# Patient Record
Sex: MALE | Race: WHITE | NOT HISPANIC OR LATINO | Employment: FULL TIME | URBAN - METROPOLITAN AREA
[De-identification: names, ages, dates, MRNs, and addresses within clinical notes are randomized per-mention and may not be internally consistent; named-entity substitution may affect disease eponyms.]

---

## 2023-07-07 ENCOUNTER — OFFICE VISIT (OUTPATIENT)
Dept: URGENT CARE | Facility: CLINIC | Age: 33
End: 2023-07-07
Payer: COMMERCIAL

## 2023-07-07 VITALS
OXYGEN SATURATION: 98 % | TEMPERATURE: 98 F | DIASTOLIC BLOOD PRESSURE: 90 MMHG | HEART RATE: 75 BPM | RESPIRATION RATE: 16 BRPM | SYSTOLIC BLOOD PRESSURE: 145 MMHG

## 2023-07-07 DIAGNOSIS — S01.91XA: Primary | ICD-10-CM

## 2023-07-07 PROCEDURE — G0382 LEV 3 HOSP TYPE B ED VISIT: HCPCS | Performed by: PHYSICIAN ASSISTANT

## 2023-07-07 NOTE — PATIENT INSTRUCTIONS
Laceration Care    Cleaning the wound   - Clean the wound 2-3 times daily with a 1:1 mixture peroxide water. - Clean off all scabbing debris that may form on the wound line gently.    - Pat dry with a clean towel or gauze, apply Neosporin or bacitracin, and apply clean dressing.   - Return here or go to the ER immediately with any signs of infection (increased redness, increased swelling, increased pain, pus drainage, or fever)

## 2023-07-07 NOTE — PROGRESS NOTES
[facility]        NAME: Jyoti Layton is a 29 y.o. male  : 1990    MRN: [id number]  DATE: 2023  TIME: 6:34 PM      Assessment and Plan     Laceration of head without foreign body, initial encounter Jassi Tubbs  1. Laceration of head without foreign body, initial encounter          Note: The wound is still closely approximated, and I told the patient that if we didn't close it, it would just leave a scar, but it doesn't have to be closed. Patient opted not to close wound and just put bacitracin on it at home and keep it clean. Patient Instructions     Patient Instructions   Laceration Care    Cleaning the wound   - Clean the wound 2-3 times daily with a 1:1 mixture peroxide water. - Clean off all scabbing debris that may form on the wound line gently. - Pat dry with a clean towel or gauze, apply Neosporin or bacitracin, and apply clean dressing.   - Return here or go to the ER immediately with any signs of infection (increased redness, increased swelling, increased pain, pus drainage, or fever)          Follow up with PCP in 3-5 days. Go to ER if symptoms worsen. Chief Complaint     Chief Complaint   Patient presents with   • Head Laceration     Pt reports hitting top of head on door latch earlier today, reports Tetanus within last 5 years. History of Present Illness     Patient presents with a laceration to the left forehead/hairline x today at 11:15am. He states he hit it on a van door latch. He rinsed it out with tap water and cleaned it with hydrogen peroxide. He denies pain, headache, nausea, or dizziness. Head Laceration  Pertinent negatives include no abdominal pain, arthralgias, chest pain, chills, congestion, coughing, fatigue, fever, headaches, myalgias, nausea, numbness, rash, sore throat or vomiting. Review of Systems     Review of Systems   Constitutional: Negative for chills, fatigue and fever.    HENT: Negative for congestion, ear pain, postnasal drip, rhinorrhea, sinus pressure, sinus pain, sneezing and sore throat. Eyes: Negative for pain and visual disturbance. Respiratory: Negative for cough and shortness of breath. Cardiovascular: Negative for chest pain and palpitations. Gastrointestinal: Negative for abdominal pain, diarrhea, nausea and vomiting. Genitourinary: Negative for dysuria and hematuria. Musculoskeletal: Negative for arthralgias, back pain and myalgias. Skin: Positive for wound. Negative for rash. Neurological: Negative for dizziness, seizures, syncope, numbness and headaches. All other systems reviewed and are negative. Current Medications     No current outpatient medications on file. Current Allergies     Allergies as of 07/07/2023 - never reviewed   Allergen Reaction Noted   • Pollen extract Allergic Rhinitis 05/06/2015              The following portions of the patient's history were reviewed and updated as appropriate: allergies, current medications, past family history, past medical history, past social history, past surgical history, and problem list.     History reviewed. No pertinent past medical history. History reviewed. No pertinent surgical history. History reviewed. No pertinent family history. Medications have been verified. Objective     /90   Pulse 75   Temp 98 °F (36.7 °C)   Resp 16   SpO2 98%   No LMP for male patient. Physical Exam     Physical Exam  Vitals and nursing note reviewed. Constitutional:       Appearance: Normal appearance. He is normal weight. HENT:      Head: Normocephalic. Comments: 3cm laceration on scalp in hairline. Bleeding controlled. Wound edges still closely approximated. No signs of infection  Eyes:      Extraocular Movements: Extraocular movements intact. Conjunctiva/sclera: Conjunctivae normal.      Pupils: Pupils are equal, round, and reactive to light. Cardiovascular:      Rate and Rhythm: Normal rate. Pulmonary:      Effort: Pulmonary effort is normal.   Skin:     General: Skin is warm and dry. Neurological:      General: No focal deficit present. Mental Status: He is alert and oriented to person, place, and time. Psychiatric:         Mood and Affect: Mood normal.         Behavior: Behavior normal.       Procedures:   Cleaned laceration with NSS to visualize it better. Wound edges mostly approximated naturally. Wound is in the part line of his hair.

## 2025-04-12 ENCOUNTER — APPOINTMENT (OUTPATIENT)
Dept: URGENT CARE | Age: 35
End: 2025-04-12
Payer: OTHER MISCELLANEOUS

## 2025-04-12 PROCEDURE — 99283 EMERGENCY DEPT VISIT LOW MDM: CPT

## 2025-04-12 PROCEDURE — G0382 LEV 3 HOSP TYPE B ED VISIT: HCPCS

## 2025-04-15 ENCOUNTER — APPOINTMENT (OUTPATIENT)
Age: 35
End: 2025-04-15
Payer: OTHER MISCELLANEOUS

## 2025-04-15 PROCEDURE — 99213 OFFICE O/P EST LOW 20 MIN: CPT | Performed by: EMERGENCY MEDICINE
